# Patient Record
Sex: MALE | URBAN - METROPOLITAN AREA
[De-identification: names, ages, dates, MRNs, and addresses within clinical notes are randomized per-mention and may not be internally consistent; named-entity substitution may affect disease eponyms.]

---

## 2024-01-18 ENCOUNTER — APPOINTMENT (OUTPATIENT)
Dept: URBAN - METROPOLITAN AREA SURGERY 9 | Age: 34
Setting detail: DERMATOLOGY
End: 2024-01-19

## 2024-01-18 DIAGNOSIS — L20.89 OTHER ATOPIC DERMATITIS: ICD-10-CM

## 2024-01-18 PROCEDURE — 99204 OFFICE O/P NEW MOD 45 MIN: CPT

## 2024-01-18 PROCEDURE — OTHER PRESCRIPTION: OTHER

## 2024-01-18 PROCEDURE — OTHER COUNSELING: OTHER

## 2024-01-18 PROCEDURE — OTHER PRESCRIPTION MEDICATION MANAGEMENT: OTHER

## 2024-01-18 PROCEDURE — OTHER MIPS QUALITY: OTHER

## 2024-01-18 RX ORDER — PREDNISONE 10 MG/1
TABLET ORAL
Qty: 40 | Refills: 0 | Status: ERX | COMMUNITY
Start: 2024-01-18

## 2024-01-18 RX ORDER — TRIAMCINOLONE ACETONIDE 1 MG/G
CREAM TOPICAL BID
Qty: 454 | Refills: 1 | Status: ERX | COMMUNITY
Start: 2024-01-18

## 2024-01-18 RX ORDER — CEFDINIR 300 MG/1
CAPSULE ORAL
Qty: 14 | Refills: 0 | Status: ERX | COMMUNITY
Start: 2024-01-18

## 2024-01-18 ASSESSMENT — ITCH NUMERIC RATING SCALE: HOW SEVERE IS YOUR ITCHING?: 10

## 2024-01-18 ASSESSMENT — SEVERITY ASSESSMENT 2020: SEVERITY 2020: SEVERE

## 2024-01-18 ASSESSMENT — BSA ECZEMA: % BODY COVERED IN ECZEMA: 80

## 2024-01-18 NOTE — PROCEDURE: PRESCRIPTION MEDICATION MANAGEMENT
Render In Strict Bullet Format?: No
Detail Level: Zone
Plan: Refer for patch testing.
Initiate Treatment: Cefdinir \\nprednisone \\nMix Triamcinolone 0.1%, Cetaphil/cerave, Sarna and vaseline.  Place in fridge and apply after shower.

## 2024-01-18 NOTE — HPI: RASH
Is This A New Presentation, Or A Follow-Up?: Rash
Additional History: Right after shit it cleared then came back with a vengeance. He fell off a roof and had to have surgery on his right hand and this started about 3 months after that. It is disrupting his sleep.